# Patient Record
Sex: MALE | Race: WHITE | NOT HISPANIC OR LATINO | URBAN - METROPOLITAN AREA
[De-identification: names, ages, dates, MRNs, and addresses within clinical notes are randomized per-mention and may not be internally consistent; named-entity substitution may affect disease eponyms.]

---

## 2023-06-02 ENCOUNTER — OFFICE VISIT (OUTPATIENT)
Dept: PRIMARY CARE | Facility: CLINIC | Age: 63
End: 2023-06-02
Payer: COMMERCIAL

## 2023-06-02 VITALS
HEIGHT: 68 IN | BODY MASS INDEX: 34.56 KG/M2 | WEIGHT: 228 LBS | TEMPERATURE: 97.9 F | SYSTOLIC BLOOD PRESSURE: 140 MMHG | HEART RATE: 53 BPM | DIASTOLIC BLOOD PRESSURE: 80 MMHG

## 2023-06-02 DIAGNOSIS — M54.50 CHRONIC BILATERAL LOW BACK PAIN WITHOUT SCIATICA: ICD-10-CM

## 2023-06-02 DIAGNOSIS — G89.29 CHRONIC BILATERAL LOW BACK PAIN WITHOUT SCIATICA: ICD-10-CM

## 2023-06-02 DIAGNOSIS — S39.012A STRAIN OF LUMBAR REGION, INITIAL ENCOUNTER: Primary | ICD-10-CM

## 2023-06-02 PROCEDURE — 1036F TOBACCO NON-USER: CPT | Performed by: STUDENT IN AN ORGANIZED HEALTH CARE EDUCATION/TRAINING PROGRAM

## 2023-06-02 PROCEDURE — 99213 OFFICE O/P EST LOW 20 MIN: CPT | Performed by: STUDENT IN AN ORGANIZED HEALTH CARE EDUCATION/TRAINING PROGRAM

## 2023-06-02 RX ORDER — CYCLOBENZAPRINE HCL 5 MG
5 TABLET ORAL NIGHTLY PRN
Qty: 14 TABLET | Refills: 0 | Status: SHIPPED | OUTPATIENT
Start: 2023-06-02 | End: 2023-08-01

## 2023-06-02 RX ORDER — ASCORBIC ACID 500 MG
500 TABLET,CHEWABLE ORAL
COMMUNITY

## 2023-06-02 RX ORDER — METHYLPREDNISOLONE 4 MG/1
TABLET ORAL
Qty: 21 TABLET | Refills: 0 | Status: SHIPPED | OUTPATIENT
Start: 2023-06-02 | End: 2023-06-09

## 2023-06-02 ASSESSMENT — ENCOUNTER SYMPTOMS
LIGHT-HEADEDNESS: 0
WEAKNESS: 0
NAUSEA: 0
DIARRHEA: 0
MYALGIAS: 0
NECK PAIN: 0
NECK STIFFNESS: 0
ABDOMINAL PAIN: 0
VOMITING: 0
CONSTIPATION: 0
FATIGUE: 0
NUMBNESS: 0
CHILLS: 0
DYSURIA: 0
SHORTNESS OF BREATH: 0
BACK PAIN: 1
FREQUENCY: 0
ARTHRALGIAS: 0
HEADACHES: 0
DIZZINESS: 0
FEVER: 0

## 2023-06-02 ASSESSMENT — PATIENT HEALTH QUESTIONNAIRE - PHQ9
1. LITTLE INTEREST OR PLEASURE IN DOING THINGS: NOT AT ALL
SUM OF ALL RESPONSES TO PHQ9 QUESTIONS 1 AND 2: 0
2. FEELING DOWN, DEPRESSED OR HOPELESS: NOT AT ALL

## 2023-06-02 ASSESSMENT — PAIN SCALES - GENERAL: PAINLEVEL: 3

## 2023-06-02 NOTE — PROGRESS NOTES
"Subjective   Patient ID: Reynaldo Guzman is a 63 y.o. male who presents for Back Pain.    Back Pain  Pertinent negatives include no abdominal pain, chest pain, dysuria, fever, headaches, numbness or weakness.       Overall back pain that has been ongoing  It will flare up from time to time.  This has happened about 3-4 times in the last 4-6 months.  The last time in [place] he reached down and as he stood back up it locked up on him.  He saw a chiropractor who told him he had bulging discs in the back. He had a stimulator and an adjustment.  It is still tight but overall feeling better.  Rates pain 2-3/10.  He has not been taking any medications at home for it.    Review of Systems   Constitutional:  Negative for chills, fatigue and fever.   Respiratory:  Negative for shortness of breath.    Cardiovascular:  Negative for chest pain.   Gastrointestinal:  Negative for abdominal pain, constipation, diarrhea, nausea and vomiting.   Genitourinary:  Negative for dysuria and frequency.   Musculoskeletal:  Positive for back pain. Negative for arthralgias, myalgias, neck pain and neck stiffness.   Neurological:  Negative for dizziness, weakness, light-headedness, numbness and headaches.       Objective   /80   Pulse 53   Temp 36.6 °C (97.9 °F)   Ht 1.727 m (5' 8\")   Wt 103 kg (228 lb)   BMI 34.67 kg/m²     Physical Exam  Vitals and nursing note reviewed.   Constitutional:       General: He is not in acute distress.     Appearance: Normal appearance. He is not ill-appearing or toxic-appearing.   Cardiovascular:      Rate and Rhythm: Normal rate and regular rhythm.      Heart sounds: Normal heart sounds.   Pulmonary:      Effort: Pulmonary effort is normal.      Breath sounds: Normal breath sounds.   Musculoskeletal:         General: Tenderness present. No swelling, deformity or signs of injury. Normal range of motion.   Neurological:      Mental Status: He is alert.         Assessment/Plan   Problem List Items " Addressed This Visit    None  Visit Diagnoses       Strain of lumbar region, initial encounter    -  Primary    Relevant Medications    cyclobenzaprine (Flexeril) 5 mg tablet    methylPREDNISolone (Medrol Dospak) 4 mg tablets    Other Relevant Orders    XR lumbar spine 2-3 views    Chronic bilateral low back pain without sciatica        Relevant Orders    XR lumbar spine 2-3 views          Likely a strain of his lower back.  No previous imaging.  X-rays ordered of the lumbar spine.  Given treatment with a muscle relaxer that he can take as needed at night if his symptoms continue.  Given a Medrol Dosepak that he can hold off on intake if his symptoms flare again.  Discussed regular use of heat and ice.  Given handouts for stretches and other exercises to be done.  We will follow-up on x-ray results.

## 2024-06-27 ENCOUNTER — OFFICE VISIT (OUTPATIENT)
Dept: PRIMARY CARE | Facility: CLINIC | Age: 64
End: 2024-06-27
Payer: COMMERCIAL

## 2024-06-27 ENCOUNTER — LAB (OUTPATIENT)
Dept: LAB | Facility: LAB | Age: 64
End: 2024-06-27
Payer: COMMERCIAL

## 2024-06-27 VITALS
SYSTOLIC BLOOD PRESSURE: 122 MMHG | HEART RATE: 62 BPM | DIASTOLIC BLOOD PRESSURE: 72 MMHG | BODY MASS INDEX: 33.65 KG/M2 | WEIGHT: 222 LBS | OXYGEN SATURATION: 97 % | TEMPERATURE: 97.4 F | HEIGHT: 68 IN

## 2024-06-27 DIAGNOSIS — Z00.00 ENCOUNTER FOR ANNUAL GENERAL MEDICAL EXAMINATION WITHOUT ABNORMAL FINDINGS IN ADULT: ICD-10-CM

## 2024-06-27 DIAGNOSIS — R35.0 FREQUENCY OF URINATION: ICD-10-CM

## 2024-06-27 DIAGNOSIS — R53.83 FATIGUE, UNSPECIFIED TYPE: ICD-10-CM

## 2024-06-27 DIAGNOSIS — B95.2 UTI (URINARY TRACT INFECTION) DUE TO ENTEROCOCCUS: ICD-10-CM

## 2024-06-27 DIAGNOSIS — N39.0 UTI (URINARY TRACT INFECTION) DUE TO ENTEROCOCCUS: ICD-10-CM

## 2024-06-27 DIAGNOSIS — R05.1 ACUTE COUGH: ICD-10-CM

## 2024-06-27 DIAGNOSIS — R50.9 FEVER, UNSPECIFIED FEVER CAUSE: Primary | ICD-10-CM

## 2024-06-27 DIAGNOSIS — R09.81 SINUS CONGESTION: ICD-10-CM

## 2024-06-27 LAB
ALBUMIN SERPL BCP-MCNC: 4.3 G/DL (ref 3.4–5)
ALP SERPL-CCNC: 48 U/L (ref 33–136)
ALT SERPL W P-5'-P-CCNC: 42 U/L (ref 10–52)
ANION GAP SERPL CALC-SCNC: 12 MMOL/L (ref 10–20)
APPEARANCE UR: ABNORMAL
AST SERPL W P-5'-P-CCNC: 21 U/L (ref 9–39)
BASOPHILS # BLD AUTO: 0.05 X10*3/UL (ref 0–0.1)
BASOPHILS NFR BLD AUTO: 0.8 %
BILIRUB SERPL-MCNC: 1.9 MG/DL (ref 0–1.2)
BILIRUB UR STRIP.AUTO-MCNC: NEGATIVE MG/DL
BUN SERPL-MCNC: 14 MG/DL (ref 6–23)
CALCIUM SERPL-MCNC: 9 MG/DL (ref 8.6–10.6)
CHLORIDE SERPL-SCNC: 102 MMOL/L (ref 98–107)
CO2 SERPL-SCNC: 28 MMOL/L (ref 21–32)
COLOR UR: ABNORMAL
CREAT SERPL-MCNC: 1.05 MG/DL (ref 0.5–1.3)
EGFRCR SERPLBLD CKD-EPI 2021: 80 ML/MIN/1.73M*2
EOSINOPHIL # BLD AUTO: 0.23 X10*3/UL (ref 0–0.7)
EOSINOPHIL NFR BLD AUTO: 3.5 %
ERYTHROCYTE [DISTWIDTH] IN BLOOD BY AUTOMATED COUNT: 12.1 % (ref 11.5–14.5)
GLUCOSE SERPL-MCNC: 130 MG/DL (ref 74–99)
GLUCOSE UR STRIP.AUTO-MCNC: NORMAL MG/DL
HCT VFR BLD AUTO: 42.3 % (ref 41–52)
HGB BLD-MCNC: 14.5 G/DL (ref 13.5–17.5)
HOLD SPECIMEN: NORMAL
IMM GRANULOCYTES # BLD AUTO: 0.03 X10*3/UL (ref 0–0.7)
IMM GRANULOCYTES NFR BLD AUTO: 0.5 % (ref 0–0.9)
KETONES UR STRIP.AUTO-MCNC: NEGATIVE MG/DL
LEUKOCYTE ESTERASE UR QL STRIP.AUTO: NEGATIVE
LYMPHOCYTES # BLD AUTO: 1.28 X10*3/UL (ref 1.2–4.8)
LYMPHOCYTES NFR BLD AUTO: 19.5 %
MCH RBC QN AUTO: 31.7 PG (ref 26–34)
MCHC RBC AUTO-ENTMCNC: 34.3 G/DL (ref 32–36)
MCV RBC AUTO: 92 FL (ref 80–100)
MONOCYTES # BLD AUTO: 0.75 X10*3/UL (ref 0.1–1)
MONOCYTES NFR BLD AUTO: 11.5 %
NEUTROPHILS # BLD AUTO: 4.21 X10*3/UL (ref 1.2–7.7)
NEUTROPHILS NFR BLD AUTO: 64.2 %
NITRITE UR QL STRIP.AUTO: NEGATIVE
NRBC BLD-RTO: 0 /100 WBCS (ref 0–0)
PH UR STRIP.AUTO: 7.5 [PH]
PLATELET # BLD AUTO: 181 X10*3/UL (ref 150–450)
POTASSIUM SERPL-SCNC: 4.1 MMOL/L (ref 3.5–5.3)
PROT SERPL-MCNC: 7.1 G/DL (ref 6.4–8.2)
PROT UR STRIP.AUTO-MCNC: NEGATIVE MG/DL
RBC # BLD AUTO: 4.58 X10*6/UL (ref 4.5–5.9)
RBC # UR STRIP.AUTO: NEGATIVE /UL
SODIUM SERPL-SCNC: 138 MMOL/L (ref 136–145)
SP GR UR STRIP.AUTO: 1.02
UROBILINOGEN UR STRIP.AUTO-MCNC: NORMAL MG/DL
WBC # BLD AUTO: 6.6 X10*3/UL (ref 4.4–11.3)

## 2024-06-27 PROCEDURE — 81003 URINALYSIS AUTO W/O SCOPE: CPT

## 2024-06-27 PROCEDURE — 85025 COMPLETE CBC W/AUTO DIFF WBC: CPT

## 2024-06-27 PROCEDURE — 87634 RSV DNA/RNA AMP PROBE: CPT

## 2024-06-27 PROCEDURE — 36415 COLL VENOUS BLD VENIPUNCTURE: CPT

## 2024-06-27 PROCEDURE — 80053 COMPREHEN METABOLIC PANEL: CPT

## 2024-06-27 PROCEDURE — 1036F TOBACCO NON-USER: CPT | Performed by: FAMILY MEDICINE

## 2024-06-27 PROCEDURE — 87636 SARSCOV2 & INF A&B AMP PRB: CPT

## 2024-06-27 PROCEDURE — 87086 URINE CULTURE/COLONY COUNT: CPT | Performed by: FAMILY MEDICINE

## 2024-06-27 PROCEDURE — 99213 OFFICE O/P EST LOW 20 MIN: CPT | Performed by: FAMILY MEDICINE

## 2024-06-27 RX ORDER — L.ACID,FERM,PLA,RHA/B.BIF,LONG 126 MG
1 TABLET, DELAYED AND EXTENDED RELEASE ORAL DAILY
COMMUNITY
Start: 2021-12-13

## 2024-06-27 RX ORDER — CYCLOBENZAPRINE HCL 10 MG
10 TABLET ORAL 3 TIMES DAILY PRN
COMMUNITY
Start: 2024-06-19

## 2024-06-27 RX ORDER — FLAXSEED OIL 1000 MG
1 CAPSULE ORAL DAILY
COMMUNITY
Start: 2022-02-07

## 2024-06-27 RX ORDER — AMOXICILLIN AND CLAVULANATE POTASSIUM 875; 125 MG/1; MG/1
875 TABLET, FILM COATED ORAL 2 TIMES DAILY
COMMUNITY
Start: 2024-06-21 | End: 2024-06-28

## 2024-06-27 RX ORDER — NAPROXEN 500 MG/1
500 TABLET ORAL EVERY 12 HOURS PRN
COMMUNITY
Start: 2024-06-19 | End: 2024-07-03

## 2024-06-27 ASSESSMENT — ENCOUNTER SYMPTOMS
DYSURIA: 0
LOSS OF SENSATION IN FEET: 0
SINUS PAIN: 0
ACTIVITY CHANGE: 0
CARDIOVASCULAR NEGATIVE: 1
CHEST TIGHTNESS: 0
FEVER: 1
NECK PAIN: 0
JOINT SWELLING: 0
ANAL BLEEDING: 0
EYE PAIN: 0
STRIDOR: 0
EYE REDNESS: 0
RHINORRHEA: 0
COUGH: 0
EYE ITCHING: 0
DIAPHORESIS: 0
DIARRHEA: 0
DEPRESSION: 0
OCCASIONAL FEELINGS OF UNSTEADINESS: 0

## 2024-06-27 ASSESSMENT — PATIENT HEALTH QUESTIONNAIRE - PHQ9
10. IF YOU CHECKED OFF ANY PROBLEMS, HOW DIFFICULT HAVE THESE PROBLEMS MADE IT FOR YOU TO DO YOUR WORK, TAKE CARE OF THINGS AT HOME, OR GET ALONG WITH OTHER PEOPLE: NOT DIFFICULT AT ALL
SUM OF ALL RESPONSES TO PHQ9 QUESTIONS 1 AND 2: 0
2. FEELING DOWN, DEPRESSED OR HOPELESS: NOT AT ALL
1. LITTLE INTEREST OR PLEASURE IN DOING THINGS: NOT AT ALL

## 2024-06-27 NOTE — PATIENT INSTRUCTIONS
Treated for urinary tract infection.    Still having some fever chills awaiting the lab studies and the urine report.  If the urinalysis still shows signs or symptoms of infection will change antibiotic therapy.    Please complete Augmentin therapy.    If any troubles with high fever shaking chills overnight or inability eat or drink please call day or night.    Cultures also performed

## 2024-06-27 NOTE — ASSESSMENT & PLAN NOTE
UTI noted    Culture and sensitivity shows Enterococcus faecalis sensitive to Amoxil completing Augmentin therapy

## 2024-06-27 NOTE — PROGRESS NOTES
"Subjective   Patient ID: Reynaldo Guzman is a 64 y.o. male who presents for Follow-up (ER visit for low back pain).    Patient presented to the emergency department with low back discomfort.  Patient was noted to have musculoskeletal issues but also UTI patient's culture shows evidence of Enterococcus faecalis.    Was treated initially with cephalexin therapy but then was switched to Augmentin therapy because of the culture and sensitivity.    No troubles with nausea or vomiting no troubles with flank pain or discomfort has been taking Tylenol and Advil probably twice a day.  Had some chills this morning took the Tylenol now no more fever no chills no headache no neck pain or discomfort.    3 to 4 days after ER visit patient then developed the chills.  Not a lot of nasal drainage congestion no significant sore throat no coughing appetite has been diminished but no nausea no troubles with diarrhea may be some constipation no rash         Review of Systems   Constitutional:  Positive for fever. Negative for activity change and diaphoresis.   HENT:  Negative for congestion, dental problem, ear discharge, mouth sores, nosebleeds, rhinorrhea and sinus pain.    Eyes:  Negative for pain, redness and itching.   Respiratory:  Negative for cough, chest tightness and stridor.    Cardiovascular: Negative.  Negative for chest pain and leg swelling.   Gastrointestinal:  Negative for anal bleeding and diarrhea.   Endocrine: Negative for heat intolerance.   Genitourinary:  Negative for dysuria, enuresis, genital sores and scrotal swelling.   Musculoskeletal:  Negative for joint swelling and neck pain.       Objective   /72   Pulse 62   Temp 36.3 °C (97.4 °F)   Ht 1.727 m (5' 8\")   Wt 101 kg (222 lb)   SpO2 97%   BMI 33.75 kg/m²   BSA Body surface area is 2.2 meters squared.      Physical Exam  Constitutional:       General: He is not in acute distress.     Appearance: Normal appearance. He is not ill-appearing, " toxic-appearing or diaphoretic.   HENT:      Head: Normocephalic.      Right Ear: Tympanic membrane normal.      Left Ear: Tympanic membrane normal.      Nose: Nose normal.      Mouth/Throat:      Pharynx: Oropharynx is clear.   Eyes:      Extraocular Movements: Extraocular movements intact.      Pupils: Pupils are equal, round, and reactive to light.   Cardiovascular:      Rate and Rhythm: Normal rate and regular rhythm.      Heart sounds: No murmur heard.  Pulmonary:      Effort: Pulmonary effort is normal.      Breath sounds: Normal breath sounds.   Abdominal:      General: Abdomen is flat.   Musculoskeletal:      Cervical back: Normal range of motion.   Neurological:      Mental Status: He is alert.       No visits with results within 1 Year(s) from this visit.   Latest known visit with results is:   [facility] Encounter on 01/30/2023   Component Date Value Ref Range Status    Ventricular Rate 01/30/2023 50  BPM Final    Atrial Rate 01/30/2023 50  BPM Final    WI Interval 01/30/2023 182  ms Final    QRS Duration 01/30/2023 108  ms Final    QT Interval 01/30/2023 420  ms Final    QTC Calculation(Bazett) 01/30/2023 382  ms Final    P Axis 01/30/2023 39  degrees Final    R Axis 01/30/2023 -36  degrees Final    T [place] 01/30/2023 9  degrees Final    QRS Count 01/30/2023 8  beats Final    Q Onset 01/30/2023 211  ms Final    P Onset 01/30/2023 120  ms Final    P Offset 01/30/2023 177  ms Final    T Offset 01/30/2023 421  ms Final    QTC [place] 01/30/2023 395  ms Final     Current Outpatient Medications on File Prior to Visit   Medication Sig Dispense Refill    amoxicillin-pot clavulanate (Augmentin) 875-125 mg tablet Take 1 tablet (875 mg) by mouth twice a day.      ascorbic acid (Vitamin C) 500 mg chewable tablet Chew 1 tablet (500 mg) once daily.      cyclobenzaprine (Flexeril) 10 mg tablet Take 1 tablet (10 mg) by mouth 3 times a day as needed for muscle spasms.      fish oil concentrate (Omega-3) 120-180 mg capsule  Take 1 capsule (1 g) by mouth once daily.      flaxseed oiL 1,000 mg capsule Take 1 capsule (1,000 mg) by mouth once daily.      L.acid,ferm,fanta,rha-B.bif,long (Controlled Delivery Probiotic) 126 mg (2 billion cell) tablet,delayed and ext.release Take 1 tablet by mouth once daily.      naproxen (Naprosyn) 500 mg tablet Take 1 tablet (500 mg) by mouth every 12 hours if needed for moderate pain (4 - 6).       No current facility-administered medications on file prior to visit.     No images are attached to the encounter.            Assessment/Plan   Problem List Items Addressed This Visit             ICD-10-CM    UTI (urinary tract infection) due to Enterococcus N39.0, B95.2     UTI noted    Culture and sensitivity shows Enterococcus faecalis sensitive to Amoxil completing Augmentin therapy         Fever - Primary R50.9    Relevant Orders    RSV PCR    Sars-CoV-2 PCR    Influenza A, and B PCR     Other Visit Diagnoses         Codes    Encounter for annual general medical examination without abnormal findings in adult     Z00.00    Relevant Orders    CBC and Auto Differential    Comprehensive Metabolic Panel    Frequency of urination     R35.0    Relevant Orders    Urinalysis with Reflex Culture and Microscopic    Fatigue, unspecified type     R53.83    Relevant Orders    Comprehensive Metabolic Panel    Sinus congestion     R09.81    Relevant Orders    RSV PCR    Sars-CoV-2 PCR    Influenza A, and B PCR    Acute cough     R05.1    Relevant Orders    RSV PCR    Sars-CoV-2 PCR    Influenza A, and B PCR

## 2024-06-28 LAB
FLUAV RNA RESP QL NAA+PROBE: NOT DETECTED
FLUBV RNA RESP QL NAA+PROBE: NOT DETECTED
RSV RNA RESP QL NAA+PROBE: NOT DETECTED
SARS-COV-2 RNA RESP QL NAA+PROBE: NOT DETECTED

## 2024-06-30 LAB — BACTERIA UR CULT: NO GROWTH

## 2025-03-03 ENCOUNTER — OFFICE VISIT (OUTPATIENT)
Dept: PRIMARY CARE | Facility: CLINIC | Age: 65
End: 2025-03-03
Payer: COMMERCIAL

## 2025-03-03 VITALS
DIASTOLIC BLOOD PRESSURE: 90 MMHG | WEIGHT: 231 LBS | TEMPERATURE: 97.3 F | BODY MASS INDEX: 35.01 KG/M2 | HEART RATE: 56 BPM | HEIGHT: 68 IN | SYSTOLIC BLOOD PRESSURE: 144 MMHG | OXYGEN SATURATION: 97 %

## 2025-03-03 DIAGNOSIS — I28.8 DILATION OF PULMONARY ARTERY (MULTI): ICD-10-CM

## 2025-03-03 DIAGNOSIS — R35.0 BENIGN PROSTATIC HYPERPLASIA WITH URINARY FREQUENCY: Primary | ICD-10-CM

## 2025-03-03 DIAGNOSIS — N40.1 BENIGN PROSTATIC HYPERPLASIA WITH URINARY FREQUENCY: Primary | ICD-10-CM

## 2025-03-03 DIAGNOSIS — Q25.40 ABNORMALITY OF THORACIC AORTA (HHS-HCC): ICD-10-CM

## 2025-03-03 DIAGNOSIS — N30.90 CYSTITIS: ICD-10-CM

## 2025-03-03 LAB
POC APPEARANCE, URINE: CLEAR
POC BILIRUBIN, URINE: NEGATIVE
POC BLOOD, URINE: NEGATIVE
POC COLOR, URINE: YELLOW
POC GLUCOSE, URINE: NEGATIVE MG/DL
POC KETONES, URINE: NEGATIVE MG/DL
POC LEUKOCYTES, URINE: ABNORMAL
POC NITRITE,URINE: NEGATIVE
POC PH, URINE: 6.5 PH
POC PROTEIN, URINE: NEGATIVE MG/DL
POC SPECIFIC GRAVITY, URINE: 1.01
POC UROBILINOGEN, URINE: 0.2 EU/DL

## 2025-03-03 PROCEDURE — 99213 OFFICE O/P EST LOW 20 MIN: CPT | Performed by: FAMILY MEDICINE

## 2025-03-03 PROCEDURE — 81003 URINALYSIS AUTO W/O SCOPE: CPT | Performed by: FAMILY MEDICINE

## 2025-03-03 PROCEDURE — 3008F BODY MASS INDEX DOCD: CPT | Performed by: FAMILY MEDICINE

## 2025-03-03 RX ORDER — TAMSULOSIN HYDROCHLORIDE 0.4 MG/1
0.4 CAPSULE ORAL NIGHTLY
Qty: 30 CAPSULE | Refills: 0 | Status: SHIPPED | OUTPATIENT
Start: 2025-03-03 | End: 2025-04-02

## 2025-03-03 RX ORDER — CEPHALEXIN 500 MG/1
500 CAPSULE ORAL 2 TIMES DAILY
Qty: 14 CAPSULE | Refills: 0 | Status: SHIPPED | OUTPATIENT
Start: 2025-03-03 | End: 2025-03-10

## 2025-03-03 ASSESSMENT — ENCOUNTER SYMPTOMS
FEVER: 0
FACIAL ASYMMETRY: 0
PHOTOPHOBIA: 0
FATIGUE: 0
DEPRESSION: 0
OCCASIONAL FEELINGS OF UNSTEADINESS: 0
LOSS OF SENSATION IN FEET: 0
FREQUENCY: 1
FACIAL SWELLING: 0
CHOKING: 0
EYE PAIN: 0
APPETITE CHANGE: 0
DYSURIA: 0

## 2025-03-03 ASSESSMENT — PATIENT HEALTH QUESTIONNAIRE - PHQ9
10. IF YOU CHECKED OFF ANY PROBLEMS, HOW DIFFICULT HAVE THESE PROBLEMS MADE IT FOR YOU TO DO YOUR WORK, TAKE CARE OF THINGS AT HOME, OR GET ALONG WITH OTHER PEOPLE: NOT DIFFICULT AT ALL
2. FEELING DOWN, DEPRESSED OR HOPELESS: NOT AT ALL
1. LITTLE INTEREST OR PLEASURE IN DOING THINGS: NOT AT ALL
SUM OF ALL RESPONSES TO PHQ9 QUESTIONS 1 AND 2: 0

## 2025-03-03 NOTE — ASSESSMENT & PLAN NOTE
Reviewed with patient today.  He saw cardiologist Dr. Draper who recommended that he have an echocardiogram in 1 year which should have been done approximately 6 months ago.    He will check with the insurance and once things have been transitioned over we will order this for him

## 2025-03-03 NOTE — PROGRESS NOTES
"Subjective   Patient ID: Reynaldo Guzman is a 64 y.o. male who presents for Difficulty Urinating.    3 times at night.  Patient is up 3 times at night to urinate.  About every 2-3 hours during the day.  Not a lot of burning with urination.    No problems with blood in the urine    No high fever or shaking chills.  No flank pain or discomfort.  Patient has had history of kidney stones.  Patient sees urologist Dr. Gonzalez but unable to see them because of insurance change at this moment.    Patient also had history of aortic ectasia thoracic aorta he did see Dr. Draper about this.  It was recommended to have repeat echocardiogram to ensure stability.    No troubles with chest pain shortness of breath no dizziness no lightheadedness    Every 3-4 hours a day    Difficulty Urinating   Associated symptoms include frequency and urgency.    patient having symptoms times    Review of Systems   Constitutional:  Negative for appetite change, fatigue and fever.   HENT:  Negative for dental problem and facial swelling.    Eyes:  Negative for photophobia and pain.   Respiratory:  Negative for choking.    Genitourinary:  Positive for frequency and urgency. Negative for dysuria, penile discharge and scrotal swelling. Enuresis: 3-4 times.  Neurological:  Negative for facial asymmetry.       Objective   /90   Pulse 56   Temp 36.3 °C (97.3 °F)   Ht 1.727 m (5' 8\")   Wt 105 kg (231 lb)   SpO2 97%   BMI 35.12 kg/m²   BSA Body surface area is 2.24 meters squared.      Physical Exam  HENT:      Head: Normocephalic.      Right Ear: Tympanic membrane normal.      Left Ear: Tympanic membrane normal.   Cardiovascular:      Rate and Rhythm: Normal rate and regular rhythm.      Pulses: Normal pulses.      Heart sounds: Normal heart sounds.   Pulmonary:      Effort: Pulmonary effort is normal.      Breath sounds: Normal breath sounds.   Abdominal:      General: Abdomen is flat.   Genitourinary:     Penis: Normal.       Testes: Normal. "      Comments: Prostate enlarged I could not appreciate any nodularity  Musculoskeletal:         General: No deformity.      Cervical back: No rigidity.   Neurological:      Mental Status: He is alert.       Office Visit on 06/27/2024   Component Date Value Ref Range Status    RSV PCR 06/27/2024 Not Detected  Not Detected Final    Coronavirus 2019, PCR 06/27/2024 Not Detected  Not Detected Final    Flu A Result 06/27/2024 Not Detected  Not Detected Final    Flu B Result 06/27/2024 Not Detected  Not Detected Final    Urine Culture 06/27/2024 No growth   Final   Lab on 06/27/2024   Component Date Value Ref Range Status    WBC 06/27/2024 6.6  4.4 - 11.3 x10*3/uL Final    nRBC 06/27/2024 0.0  0.0 - 0.0 /100 WBCs Final    RBC 06/27/2024 4.58  4.50 - 5.90 x10*6/uL Final    Hemoglobin 06/27/2024 14.5  13.5 - 17.5 g/dL Final    Hematocrit 06/27/2024 42.3  41.0 - 52.0 % Final    MCV 06/27/2024 92  80 - 100 fL Final    MCH 06/27/2024 31.7  26.0 - 34.0 pg Final    MCHC 06/27/2024 34.3  32.0 - 36.0 g/dL Final    RDW 06/27/2024 12.1  11.5 - 14.5 % Final    Platelets 06/27/2024 181  150 - 450 x10*3/uL Final    Neutrophils % 06/27/2024 64.2  40.0 - 80.0 % Final    Immature Granulocytes %, Automated 06/27/2024 0.5  0.0 - 0.9 % Final    Immature Granulocyte Count (IG) includes promyelocytes, myelocytes and metamyelocytes but does not include bands. Percent differential counts (%) should be interpreted in the context of the absolute cell counts (cells/UL).    Lymphocytes % 06/27/2024 19.5  13.0 - 44.0 % Final    Monocytes % 06/27/2024 11.5  2.0 - 10.0 % Final    Eosinophils % 06/27/2024 3.5  0.0 - 6.0 % Final    Basophils % 06/27/2024 0.8  0.0 - 2.0 % Final    Neutrophils Absolute 06/27/2024 4.21  1.20 - 7.70 x10*3/uL Final    Percent differential counts (%) should be interpreted in the context of the absolute cell counts (cells/uL).    Immature Granulocytes Absolute, Au* 06/27/2024 0.03  0.00 - 0.70 x10*3/uL Final    Lymphocytes  Absolute 06/27/2024 1.28  1.20 - 4.80 x10*3/uL Final    Monocytes Absolute 06/27/2024 0.75  0.10 - 1.00 x10*3/uL Final    Eosinophils Absolute 06/27/2024 0.23  0.00 - 0.70 x10*3/uL Final    Basophils Absolute 06/27/2024 0.05  0.00 - 0.10 x10*3/uL Final    Glucose 06/27/2024 130 (H)  74 - 99 mg/dL Final    Sodium 06/27/2024 138  136 - 145 mmol/L Final    Potassium 06/27/2024 4.1  3.5 - 5.3 mmol/L Final    Chloride 06/27/2024 102  98 - 107 mmol/L Final    Bicarbonate 06/27/2024 28  21 - 32 mmol/L Final    Anion Gap 06/27/2024 12  10 - 20 mmol/L Final    Urea Nitrogen 06/27/2024 14  6 - 23 mg/dL Final    Creatinine 06/27/2024 1.05  0.50 - 1.30 mg/dL Final    eGFR 06/27/2024 80  >60 mL/min/1.73m*2 Final    Calculations of estimated GFR are performed using the 2021 CKD-EPI Study Refit equation without the race variable for the IDMS-Traceable creatinine methods.  https://jasn.asnjournals.org/content/early/2021/09/22/ASN.4938817475    Calcium 06/27/2024 9.0  8.6 - 10.6 mg/dL Final    Albumin 06/27/2024 4.3  3.4 - 5.0 g/dL Final    Alkaline Phosphatase 06/27/2024 48  33 - 136 U/L Final    Total Protein 06/27/2024 7.1  6.4 - 8.2 g/dL Final    AST 06/27/2024 21  9 - 39 U/L Final    Bilirubin, Total 06/27/2024 1.9 (H)  0.0 - 1.2 mg/dL Final    ALT 06/27/2024 42  10 - 52 U/L Final    Patients treated with Sulfasalazine may generate falsely decreased results for ALT.    Color, Urine 06/27/2024 Light-Yellow  Light-Yellow, Yellow, Dark-Yellow Final    Appearance, Urine 06/27/2024 Turbid (N)  Clear Final    Specific Gravity, Urine 06/27/2024 1.017  1.005 - 1.035 Final    pH, Urine 06/27/2024 7.5  5.0, 5.5, 6.0, 6.5, 7.0, 7.5, 8.0 Final    Protein, Urine 06/27/2024 NEGATIVE  NEGATIVE, 10 (TRACE), 20 (TRACE) mg/dL Final    Glucose, Urine 06/27/2024 Normal  Normal mg/dL Final    Blood, Urine 06/27/2024 NEGATIVE  NEGATIVE Final    Ketones, Urine 06/27/2024 NEGATIVE  NEGATIVE mg/dL Final    Bilirubin, Urine 06/27/2024 NEGATIVE  NEGATIVE  Final    Urobilinogen, Urine 06/27/2024 Normal  Normal mg/dL Final    Nitrite, Urine 06/27/2024 NEGATIVE  NEGATIVE Final    Leukocyte Esterase, Urine 06/27/2024 NEGATIVE  NEGATIVE Final    Extra Tube 06/27/2024 Hold for add-ons.   Final    Auto resulted.     Current Outpatient Medications on File Prior to Visit   Medication Sig Dispense Refill    ascorbic acid (Vitamin C) 500 mg chewable tablet Chew 1 tablet (500 mg) once daily.      fish oil concentrate (Omega-3) 120-180 mg capsule Take 1 capsule (1 g) by mouth once daily.      flaxseed oiL 1,000 mg capsule Take 1 capsule (1,000 mg) by mouth once daily.      L.acid,ferm,fanta,rha-B.bif,long (Controlled Delivery Probiotic) 126 mg (2 billion cell) tablet,delayed and ext.release Take 1 tablet by mouth once daily.      [DISCONTINUED] cyclobenzaprine (Flexeril) 10 mg tablet Take 1 tablet (10 mg) by mouth 3 times a day as needed for muscle spasms.       No current facility-administered medications on file prior to visit.     No images are attached to the encounter.            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  He saw cardiologist Dr. Draper who recommended that he have an echocardiogram in 1 year which should have been done approximately 6 months ago.    He will check with the insurance and once things have been transitioned over we will order this for him          Other Visit Diagnoses         Codes    Cystitis     N30.90

## 2025-03-07 DIAGNOSIS — N39.0 UTI (URINARY TRACT INFECTION) DUE TO ENTEROCOCCUS: ICD-10-CM

## 2025-03-07 DIAGNOSIS — B95.2 UTI (URINARY TRACT INFECTION) DUE TO ENTEROCOCCUS: ICD-10-CM

## 2025-03-07 LAB — BACTERIA UR CULT: ABNORMAL

## 2025-03-25 DIAGNOSIS — N40.1 BENIGN PROSTATIC HYPERPLASIA WITH URINARY FREQUENCY: ICD-10-CM

## 2025-03-25 DIAGNOSIS — R35.0 BENIGN PROSTATIC HYPERPLASIA WITH URINARY FREQUENCY: ICD-10-CM

## 2025-03-25 RX ORDER — TAMSULOSIN HYDROCHLORIDE 0.4 MG/1
0.4 CAPSULE ORAL NIGHTLY
Qty: 90 CAPSULE | Refills: 1 | Status: SHIPPED | OUTPATIENT
Start: 2025-03-25 | End: 2025-09-21

## 2025-05-29 PROCEDURE — 88305 TISSUE EXAM BY PATHOLOGIST: CPT | Performed by: STUDENT IN AN ORGANIZED HEALTH CARE EDUCATION/TRAINING PROGRAM

## 2025-05-29 PROCEDURE — 88305 TISSUE EXAM BY PATHOLOGIST: CPT

## 2025-05-30 ENCOUNTER — LAB REQUISITION (OUTPATIENT)
Dept: LAB | Facility: HOSPITAL | Age: 65
End: 2025-05-30
Payer: COMMERCIAL

## 2025-05-30 DIAGNOSIS — R97.20 ELEVATED PROSTATE SPECIFIC ANTIGEN (PSA): ICD-10-CM

## 2025-06-10 ENCOUNTER — APPOINTMENT (OUTPATIENT)
Dept: RADIOLOGY | Facility: CLINIC | Age: 65
End: 2025-06-10
Payer: COMMERCIAL

## 2025-06-11 LAB
LAB AP BLOCK FOR ADDITIONAL STUDIES: NORMAL
LABORATORY COMMENT REPORT: NORMAL
PATH REPORT.FINAL DX SPEC: NORMAL
PATH REPORT.GROSS SPEC: NORMAL
PATH REPORT.RELEVANT HX SPEC: NORMAL
PATH REPORT.TOTAL CANCER: NORMAL

## 2025-06-12 DIAGNOSIS — C61 PROSTATE CANCER (MULTI): Primary | ICD-10-CM

## 2025-07-23 ENCOUNTER — TELEPHONE (OUTPATIENT)
Dept: PRIMARY CARE | Facility: CLINIC | Age: 65
End: 2025-07-23
Payer: COMMERCIAL

## 2025-07-23 NOTE — TELEPHONE ENCOUNTER
Pt requesting a call back.  He would like to talk with you about ordering certain blood work panels.    Reynaldo;[phone]

## 2025-07-24 ENCOUNTER — TELEPHONE (OUTPATIENT)
Dept: PRIMARY CARE | Facility: CLINIC | Age: 65
End: 2025-07-24
Payer: COMMERCIAL

## 2025-07-24 NOTE — TELEPHONE ENCOUNTER
Patient's been diagnosed with stage IV prostate cancer.  He would like to have these labs done and is considering going to naturopathic way to treat.    I discussion with the patient that I do feel that traditional therapies in this situation would be of great benefit including radiation therapy and other therapies discussed by the urology doctors.  He would like to try and explore other therapies first.

## 2025-07-24 NOTE — TELEPHONE ENCOUNTER
Pt would like blood work ordered   [facility]- Cytometry panels cd3, cd4, cd8, Nk cell activity assay, thymic hormone (thymosin- alpha-1).

## 2025-08-05 PROBLEM — R79.89 ABNORMAL LIVER FUNCTION TEST: Status: ACTIVE | Noted: 2025-08-05

## 2025-08-05 PROBLEM — K76.9 LIVER LESION: Status: ACTIVE | Noted: 2025-08-05

## 2025-08-05 PROBLEM — L08.9 INFECTED INCLUSION CYST: Status: ACTIVE | Noted: 2025-08-05

## 2025-08-05 PROBLEM — N42.9 DISORDER OF PROSTATE: Status: ACTIVE | Noted: 2025-08-05

## 2025-08-05 PROBLEM — R10.11 COLICKY RIGHT UPPER QUADRANT PAIN: Status: ACTIVE | Noted: 2025-08-05

## 2025-08-05 PROBLEM — R73.9 HYPERGLYCEMIA: Status: ACTIVE | Noted: 2025-08-05

## 2025-08-05 PROBLEM — Z96.1 PSEUDOPHAKIA: Status: ACTIVE | Noted: 2024-03-20

## 2025-08-05 PROBLEM — H25.011 CORTICAL AGE-RELATED CATARACT OF RIGHT EYE: Status: ACTIVE | Noted: 2025-08-05

## 2025-08-05 PROBLEM — H52.7 REFRACTIVE ERROR: Status: ACTIVE | Noted: 2017-02-02

## 2025-08-05 PROBLEM — R17 INCREASED BILIRUBIN LEVEL: Status: ACTIVE | Noted: 2025-08-05

## 2025-08-05 PROBLEM — D64.9 ANEMIA: Status: ACTIVE | Noted: 2018-05-08

## 2025-08-05 PROBLEM — I77.89 ENLARGED THORACIC AORTA: Status: ACTIVE | Noted: 2025-03-03

## 2025-08-05 PROBLEM — M17.9 OSTEOARTHRITIS OF KNEE: Status: ACTIVE | Noted: 2018-06-12

## 2025-08-05 PROBLEM — E78.5 HYPERLIPIDEMIA: Status: ACTIVE | Noted: 2025-08-05

## 2025-08-05 PROBLEM — E66.9 OBESITY: Status: ACTIVE | Noted: 2025-08-05

## 2025-08-05 PROBLEM — I25.10 ARTERIOSCLEROSIS OF CORONARY ARTERY: Status: ACTIVE | Noted: 2025-08-05

## 2025-08-05 PROBLEM — S83.91XA SPRAIN OF RIGHT KNEE: Status: ACTIVE | Noted: 2025-08-05

## 2025-08-05 PROBLEM — M17.0 PRIMARY OSTEOARTHRITIS OF BOTH KNEES: Status: ACTIVE | Noted: 2017-06-16

## 2025-08-05 PROBLEM — I71.21 ANEURYSM, ASCENDING AORTA: Status: ACTIVE | Noted: 2025-08-05

## 2025-08-05 PROBLEM — N20.0 NEPHROLITHIASIS: Status: ACTIVE | Noted: 2025-08-05

## 2025-08-05 PROBLEM — H40.1131 PRIMARY OPEN ANGLE GLAUCOMA OF BOTH EYES, MILD STAGE: Status: ACTIVE | Noted: 2017-02-02

## 2025-08-05 PROBLEM — L72.0 INFECTED INCLUSION CYST: Status: ACTIVE | Noted: 2025-08-05

## 2025-08-05 PROBLEM — Z96.652 STATUS POST TOTAL LEFT KNEE REPLACEMENT: Status: ACTIVE | Noted: 2020-01-30
